# Patient Record
Sex: MALE | Race: WHITE | NOT HISPANIC OR LATINO | Employment: FULL TIME | URBAN - METROPOLITAN AREA
[De-identification: names, ages, dates, MRNs, and addresses within clinical notes are randomized per-mention and may not be internally consistent; named-entity substitution may affect disease eponyms.]

---

## 2019-09-17 ENCOUNTER — OFFICE VISIT - HEALTHEAST (OUTPATIENT)
Dept: PODIATRY | Facility: CLINIC | Age: 24
End: 2019-09-17

## 2019-09-17 DIAGNOSIS — R22.42 MASS OF LEFT FOOT: ICD-10-CM

## 2019-09-17 ASSESSMENT — MIFFLIN-ST. JEOR: SCORE: 1925.19

## 2019-09-30 ENCOUNTER — HOSPITAL ENCOUNTER (OUTPATIENT)
Dept: MRI IMAGING | Facility: HOSPITAL | Age: 24
Discharge: HOME | End: 2019-09-30
Attending: PODIATRIST

## 2019-09-30 DIAGNOSIS — R22.42 MASS OF LEFT FOOT: ICD-10-CM

## 2019-10-01 ENCOUNTER — OFFICE VISIT - HEALTHEAST (OUTPATIENT)
Dept: PODIATRY | Facility: CLINIC | Age: 24
End: 2019-10-01

## 2019-10-01 ENCOUNTER — SURGERY - HEALTHEAST (OUTPATIENT)
Dept: PODIATRY | Facility: CLINIC | Age: 24
End: 2019-10-01

## 2019-10-01 DIAGNOSIS — D21.22 FIBROMA OF LEFT FOOT: ICD-10-CM

## 2019-10-01 ASSESSMENT — MIFFLIN-ST. JEOR: SCORE: 1925.19

## 2019-10-02 ENCOUNTER — COMMUNICATION - HEALTHEAST (OUTPATIENT)
Dept: PODIATRY | Facility: CLINIC | Age: 24
End: 2019-10-02

## 2019-10-08 ENCOUNTER — COMMUNICATION - HEALTHEAST (OUTPATIENT)
Dept: PODIATRY | Facility: CLINIC | Age: 24
End: 2019-10-08

## 2019-10-17 ENCOUNTER — AMBULATORY - HEALTHEAST (OUTPATIENT)
Dept: PODIATRY | Facility: CLINIC | Age: 24
End: 2019-10-17

## 2019-10-24 ENCOUNTER — COMMUNICATION - HEALTHEAST (OUTPATIENT)
Dept: PEDIATRICS | Facility: CLINIC | Age: 24
End: 2019-10-24

## 2019-10-29 ENCOUNTER — OFFICE VISIT - HEALTHEAST (OUTPATIENT)
Dept: FAMILY MEDICINE | Facility: CLINIC | Age: 24
End: 2019-10-29

## 2019-10-29 DIAGNOSIS — Z01.818 PREOPERATIVE EXAMINATION: ICD-10-CM

## 2019-10-29 DIAGNOSIS — D21.22 FIBROMA OF LEFT FOOT: ICD-10-CM

## 2019-10-29 ASSESSMENT — MIFFLIN-ST. JEOR
SCORE: 1954.11
SCORE: 1925.19

## 2019-10-31 ENCOUNTER — ANESTHESIA - HEALTHEAST (OUTPATIENT)
Dept: SURGERY | Facility: AMBULATORY SURGERY CENTER | Age: 24
End: 2019-10-31

## 2019-11-01 ENCOUNTER — SURGERY - HEALTHEAST (OUTPATIENT)
Dept: SURGERY | Facility: AMBULATORY SURGERY CENTER | Age: 24
End: 2019-11-01

## 2019-11-01 ASSESSMENT — MIFFLIN-ST. JEOR: SCORE: 1925.19

## 2019-11-04 ENCOUNTER — COMMUNICATION - HEALTHEAST (OUTPATIENT)
Dept: VASCULAR SURGERY | Facility: CLINIC | Age: 24
End: 2019-11-04

## 2019-11-12 ENCOUNTER — OFFICE VISIT - HEALTHEAST (OUTPATIENT)
Dept: PODIATRY | Facility: CLINIC | Age: 24
End: 2019-11-12

## 2019-11-12 DIAGNOSIS — M60.272 FOREIGN BODY GRANULOMA OF SOFT TISSUE OF LEFT FOOT: ICD-10-CM

## 2019-11-12 ASSESSMENT — MIFFLIN-ST. JEOR: SCORE: 1925.19

## 2019-11-19 ENCOUNTER — OFFICE VISIT - HEALTHEAST (OUTPATIENT)
Dept: PODIATRY | Facility: CLINIC | Age: 24
End: 2019-11-19

## 2019-11-19 DIAGNOSIS — M60.272 FOREIGN BODY GRANULOMA OF SOFT TISSUE OF LEFT FOOT: ICD-10-CM

## 2019-11-19 ASSESSMENT — MIFFLIN-ST. JEOR: SCORE: 1925.19

## 2021-06-01 ENCOUNTER — RECORDS - HEALTHEAST (OUTPATIENT)
Dept: FAMILY MEDICINE | Facility: CLINIC | Age: 26
End: 2021-06-01

## 2021-06-01 NOTE — PROGRESS NOTES
FOOT AND ANKLE SURGERY/PODIATRY CONSULT NOTE         ASSESSMENT:   Soft tissue mass left foot      TREATMENT:  The patient was referred to radiology for an MRI of the left foot.  The patient is to return to the clinic in 1 week.         HPI: Aron Soler is a pleasant 23-year-old male who presented to the clinic today complaining of a painful lump on the bottom of his left foot.  The patient indicated this lump is near the second toe of the left foot.  He has had this for approximately 2 years.  He stated several years ago he had a cortisone injection at the level of this lesion.  He stated he had temporary relief of his pain.  The pain in the lump has recurred.  It is quite aggravated with weightbearing and ambulation.  The pain is relieved with nonweightbearing.  He has not noticed any redness, swelling, drainage of bleeding.  The pain is nonradiating.  He does not recall any particular trauma to the left foot.       History reviewed. No pertinent past medical history.    Past Surgical History:   Procedure Laterality Date     [place] REMOVE TONSILS/ADENOIDS,<12 Y/O      Description: Tonsillectomy With Adenoidectomy;  Recorded: 08/08/2008;  Comments: 1998       No Known Allergies    No current outpatient medications on file.    History reviewed. No pertinent family history.    Social History     Socioeconomic History     Marital status: Single     Spouse name: Not on file     Number of children: Not on file     Years of education: Not on file     Highest education level: Not on file   Occupational History     Not on file   Social Needs     Financial resource strain: Not on file     Food insecurity:     Worry: Not on file     Inability: Not on file     Transportation needs:     Medical: Not on file     Non-medical: Not on file   Tobacco Use     Smoking status: Never Smoker     Smokeless tobacco: Never Used   Substance and Sexual Activity     Alcohol use: Yes     Frequency: 2-4 times a month     Drinks per session: 1  or 2     Drug use: Never     Sexual activity: Not on file   Lifestyle     Physical activity:     Days per week: Not on file     Minutes per session: Not on file     Stress: Not on file   Relationships     Social connections:     Talks on phone: Not on file     Gets together: Not on file     Attends Church service: Not on file     Active member of club or organization: Not on file     Attends meetings of clubs or organizations: Not on file     Relationship status: Not on file     Intimate partner violence:     Fear of current or ex partner: Not on file     Emotionally abused: Not on file     Physically abused: Not on file     Forced sexual activity: Not on file   Other Topics Concern     Not on file   Social History Narrative     Not on file       Review of Systems - Patient denies fever, chills, rash, wound, stiffness, limping, numbness, weakness, heart burn, blood in stool, chest pain with activity, calf pain when walking, shortness of breath with activity, chronic cough, easy bleeding/bruising, swelling of ankles, excessive thirst, fatigue, depression, anxiety.  Patient admits to painful mass left foot.      OBJECTIVE:  Appearance: alert, well appearing, and in no distress.    Vitals:    09/17/19 1513   BP: 124/88   Pulse: 76   Resp: 18   Temp: 98.1  F (36.7  C)       BMI= Body mass index is 27.12 kg/m .    General appearance: Patient is alert and fully cooperative with history & exam.  No sign of distress is noted during the visit.  Psychiatric: Affect is pleasant & appropriate.  Patient appears motivated to improve health.  Respiratory: Breathing is regular & unlabored while sitting.  HEENT: Hearing is intact to spoken word.  Speech is clear.  No gross evidence of visual impairment that would impact ambulation.    Vascular: Dorsalis pedis and posterior tibial pulses are palpable. There is pedal hair growth bilaterally.  CFT < 3 sec from anterior tibial surface to distal digits bilaterally. There is no  appreciable edema noted.  Dermatologic: There is a small raised firm palpable mass on the plantar aspect of the base of the second toe left foot.  Turgor and texture are within normal limits. No coloration or temperature changes. No other primary or secondary lesions noted.  Neurologic: All epicritic and proprioceptive sensations are grossly intact bilaterally.  Musculoskeletal: All active and passive ankle, subtalar, midtarsal, and 1st MPJ range of motion are grossly intact without pain or crepitus, with the exception of none. Manual muscle strength is within normal limits bilaterally. All dorsiflexors, plantarflexors, invertors, evertors are intact bilaterally.  No tenderness present to small lesion on the plantar aspect of the left foot on palpation.  No tenderness to second digit left foot with range of motion. Calf is soft/non-tender without warmth/induration    Imaging:     No results found.       TREATMENT:  The patient has been referred to radiology for an MRI of the left foot in an attempt to identify the lesion on the plantar aspect of the left foot.  I informed the patient that he may need surgical excision of this small mass.  I have asked the patient to return to clinic in 1 week to discuss the findings of the MRI.    Robe Almazan; CLIVE  [facility]/Podiatry

## 2021-06-01 NOTE — PROGRESS NOTES
Subjective findings: The patient return to the clinic today to discuss the findings of his MRI of the left foot.  The patient was referred to radiology for an MRI because he has a painful lump on the bottom of his left foot between the first and second toes.  I informed the patient that the MRI indicated that there is a nonspecific lesion located between the first and second toes.  This could represent a fibroma on old granuloma.  I am recommending excision and biopsy of this lesion.  The patient was told the procedure will be done on an outpatient basis under local anesthesia with IV sedation.  He was told the procedure should take approximately 15 minutes to perform.  He would then be discharged and able to weight-bear in a postoperative shoe for 2 weeks.  The patient was asked to go n.p.o. at midnight prior to the procedure and he was asked to see his primary care physician for preoperative consultation.  The patient was given a written list of potential postoperative complications.  I informed the patient he could have some potential numbness in the area of the lesion as result of this procedure.  All pertinent questions were answered.

## 2021-06-01 NOTE — PATIENT INSTRUCTIONS - HE
Surgery Information    Surgery Date Reema will call you     ( Arrive at the hospital one and a half hours prior to your surgery and 1 hour prior at the surgery center. Check in at the . The only exception is if you are scheduled for surgery at 7am, you should arrive at 5:30am) The nurse will call you a couple of day before surgery go with the time the nurse tells you.  All surgery times are estimated please go with what the nurse tells you when she calls.  Emergency's do happen and surgery times do get changed the nurse will let you know.  We give you the best estimate of time that we can at the time.      IF YOU NEED TO RESCHEDULE OR CANCEL YOUR SURGERY FOR ANY REASON PLEASE CALL THE CLINIC AS SOON AS POSSIBLE -[phone].    Admission Type: Outpatient    Surgeon: Dr. Almazan    Surgery Procedure:Excision Fibroma(50123)    Surgery Location: [facility],[address] [place] [address], FAX [phone]    Additional Information: If you use a CPAP machine for sleep apnea please bring it with you for surgery. We will want to monitor your breathing using your normal equipment.     HOSPITAL AND SURGERY CENTER INFORMATION    We need to know a lot of information about you before surgery.     1-5 Days Before:     A nurse will call you for a pre-screening interview. The phone call with the nurse will be much faster and easier if you  Have organized your information prior to the call.   This is when you will be told when to show up and what to bring.    Please have the following information available:    Preoperative Exam completed and faxed to our office  has to be within 30 days will not except 31 days.    Primary care provider's and any specialty providers' contact information available. .    If requested by your primary care provider, have any heart and lung exams at least 3 days before surgery.    Have a complete and accurate list of medications available.     Have a list of your  allergies/sensitivities and reactions    Know your health history, surgical history, and medical problems    Know any anesthesia issues with you or within your family.     BE SURE TO NOTIFY US IF YOU ARE TAKING ANY BLOOD THINNING AGENTS: Coumadin, Plavix, Aspirin, Xarelto, Eliquis    Someone plan to bring you and stay with you after the surgery for 24 hours    Day Before Surgery    1. STOP Smoking and Drinking: It is important to stop smoking and drinking at least 24 hours before surgery.  Smoking and drinking may cause complications in your recovery from anesthesia and may lengthen the healing process.    Please bring with you the day of surgery      List of medication    Eyeglass case or contact case with solution. You cannot wear contacts during surgery    Copy of Health Care Directives, Living Will or Power of     Insurance Cards    Photo ID    3. NOTHING BY MOUTH 8 HOURS BEFORE. This includes gum, hard candy, water and mints. The only exception is if you have been instructed by your doctor to take your medications with sips of water. You may rinse your mouth or brush your teeth, but do not swallow water.     4. Remove Nail Polish on fingers as well as toes  Day of Surgery    1. Medications- Take as indicated with sips of water     2. Wear comfortable loose fitting clothes. Wear your glasses-Not contacts. Do not wear jewelry and remove body piercing's. Surgery may be cancelled if they are not removed.     3. If same day surgery-Have a someone come with you to surgery that can help you understand the surgeon's instructions, drive you home and stay with you overnight the first night.     4. A nurse will call you at home within 72 hours following surgery to see how you are doing. Our nurses and doctors will discuss recovery with you.   POST-OPERATIVE CARE INSTRUCTIONS    After surgery  You will have swelling in the foot, and pain from the incision. The swelling is related to the increased blood flow to the  "foot in response to the \"surgical injury\" as well as the decreased capacity of blood to return to the heart due to less lower leg muscle contractions (which have the effect of increasing venous blood return). Swelling is often directly proportional to the pain. The greatest swelling occurs in the first 3-4 days after surgery . After that the swelling gradually diminishes. However, it often takes many months (or even a year for major surgery) for all of the swelling to resolve. The pain associated with swelling can vary widely. If it is not managed appropriately it can be very uncomfortable and frustrating.      1. Following surgery, go directly home and elevate your foot. Elevate your foot about 6 inches about the level of you hip by placing pillows under your foot and leg. Do not sit with your foot down, dangling or with your feet crossed.      2. Rest as often as possible with your foot elevated to reduce the occurrence of swelling and associated pain. Reserve walking for using the restroom and getting food/beverages.     3. Place ice over foot/ ankle area or behind the knee (if casted) for 20 minutes of each hour for 24 hours. Ice should NEVER be used when the foot is numb from a nerve block as this can easily lead to frostbite.     4. Use pain medications as prescribed with food. Once home, take pain medication and do the same at supper time and bedtime. The pain medication and ice should help to \"control\" your pain, however, it may NOT take away all of the pain.      Take or ask for pain relief medication when pain begins. It is easier to prevent or relieve pain before it becomes too bad.     Some activities may make pain worse. Take your pain medicine first.     Your doctor and nurse use a 0-10 pain rating scale to report your pain. 0= no pain and 10 = the worst possible pain. Reporting a number helps us to understand how well your pain control plan is working and if your pain control plan needs changes. "      5. Keep your bandages clean and dry while the incision heals. A small amount of bleeding is normal. DO NOT CHANGE THE DRESSING! If your bandages become damp call our office immediately. Options to keep this area dry when bathing include:      SPONGE BATHS    PLASTIC BAG WITH TAPE OR CAST BAG     SHOWER CHAIR OR HANDHELD SHOWER HEAD    DANGLING YOUR LEG/FOOT OVER THE EDGE OF THE TUB     6. Exercise your legs frequently by bending your knees to stimulate circulation and help aid in healing.     7. If given a post operative shoe or cast boot wear at all times when walking. You may remove the post-operative shoe or cast boot at bedtime. If the cast boot becomes too heavy or painful it may be removed only while the foot is elevated and not bearing weight.      8. DO NOT operate heavy equipment, drive a vehicle for 24 hours after surgery and while taking pain medications. You may resume driving when you feel you can do so safely.     CALL IMMEDIATELY IF:    THE BANDAGES ARE SOAKED FROM BLOOD, DRAINAGE OR WATER    YOUR MEDICATION DOES NOT MANAGE THE DISCOMFORT    YOU INJURE YOUR FOOT    YOU DEVELOP A FEVER    THE BANDAGES BECOME TOO TIGHT OR YOUR TOES BECOME NUMB (after 24 hours you may cut your bandage 1 inch from the toe area and ankle area this will relieve pain)    PLEASE CALL THE [facility] PODIATRY LINE -[phone] WITH ANY QUESTION OR CONCERNS. IF CALLING AFTER REGULAR BUSINESS HOURS THE PHYSICIAN ON CALL WILL BE PAGED TO RETURN YOUR CALL.    The doctor will need to see you for 1-3 post operative appointments depending on your surgery and recovery. Please ensure these are scheduled before your surgery or immediately after surgery.

## 2021-06-02 NOTE — PROGRESS NOTES
Preoperative Exam    Scheduled Procedure:EXCISION, FIBROMA, PLANTAR left foot   Surgery Date:  11/1/2019  Surgery Location: [facility], fax [phone]    Surgeon:  Dr. Almazan     Assessment/Plan:     1. Fibroma of left foot  2. Preoperative examination    Cleared for surgery.    Surgical Procedure Risk: Low (reported cardiac risk generally < 1%)  Have you had prior anesthesia?: Yes  Have you or any family members had a previous anesthesia reaction:  No  Do you or any family members have a history of a clotting or bleeding disorder?: No  Cardiac Risk Assessment: no increased risk for major cardiac complications    APPROVAL GIVEN to proceed with proposed procedure, without further diagnostic evaluation    Functional Status: Independent  Patient plans to recover at home with family.     Subjective:      Aron Soler is a 24 y.o. male who presents for a preoperative consultation.  Pain in left foot plantar aspect with running or tennis. MRI showed mass, possible fibroma or granuloma. Having removal by podiatry.    All other systems reviewed and are negative, other than those listed in the HPI.    Pertinent History  Do you have difficulty breathing or chest pain after walking up a flight of stairs: No  History of obstructive sleep apnea: No  Steroid use in the last 6 months: No  Frequent Aspirin/NSAID use: No  Prior Blood Transfusion: No  Prior Blood Transfusion Reaction: No  If for some reason prior to, during or after the procedure, if it is medically indicated, would you be willing to have a blood transfusion?:  There is no transfusion refusal.    No current outpatient medications on file.     No current facility-administered medications for this visit.         No Known Allergies    Patient Active Problem List   Diagnosis     Pes Planus     Acne     Fibroma of left foot       No past medical history on file.    Past Surgical History:   Procedure Laterality Date     [place] REMOVE TONSILS/ADENOIDS,<11  Y/O      Description: Tonsillectomy With Adenoidectomy;  Recorded: 08/08/2008;  Comments: 1998       Social History     Socioeconomic History     Marital status: Single     Spouse name: Not on file     Number of children: Not on file     Years of education: Not on file     Highest education level: Not on file   Occupational History     Not on file   Social Needs     Financial resource strain: Not on file     Food insecurity:     Worry: Not on file     Inability: Not on file     Transportation needs:     Medical: Not on file     Non-medical: Not on file   Tobacco Use     Smoking status: Never Smoker     Smokeless tobacco: Never Used   Substance and Sexual Activity     Alcohol use: Yes     Frequency: 2-4 times a month     Drinks per session: 1 or 2     Drug use: Never     Sexual activity: Not on file   Lifestyle     Physical activity:     Days per week: Not on file     Minutes per session: Not on file     Stress: Not on file   Relationships     Social connections:     Talks on phone: Not on file     Gets together: Not on file     Attends [employer] service: Not on file     Active member of club or organization: Not on file     Attends meetings of clubs or organizations: Not on file     Relationship status: Not on file     Intimate partner violence:     Fear of current or ex partner: Not on file     Emotionally abused: Not on file     Physically abused: Not on file     Forced sexual activity: Not on file   Other Topics Concern     Not on file   Social History Narrative     Not on file             Objective:     Vitals:    10/29/19 1527 10/29/19 1600   BP: 142/85 124/75   Pulse: 90    Resp: 16    Temp: 98.1  F (36.7  C)    TempSrc: Oral    Weight: 206 lb 6 oz (93.6 kg)    Height: 6' (1.829 m)          Physical Exam:  GENERAL:  Pleasant, well-appearing man in no acute distress.  VITAL SIGNS:  Reviewed.  HEENT:  Pupils are equal, round, and reactive to light.  Sclerae and  conjunctivae clear.  Oropharynx is clear  with  moist mucous membranes.  NECK:  Supple without lymphadenopathy or thyromegaly.    CARDIOVASCULAR:  Heart regular rate and rhythm without murmur.  Normal S1 and  S2.  LUNGS:  Clear to auscultation bilaterally without wheezes or crackles.  Good  air movement throughout.    ABDOMEN:  Soft, nontender, and nondistended without  guarding or rebound.  No organomegaly.  EXTREMITIES:  Warm and well perfused without edema. Dorsalis pedis pulses easily palpable and symmetric bilaterally.  NEURO: Alert and oriented. Grossly nonfocal.  PSYCHIATRIC: Presents on time and well groomed.  Normal speech and thought content.  Full affect.  No abnormal movements or behaviors noted.     There are no Patient Instructions on file for this visit.      Labs:  No labs were ordered during this visit    Immunization History   Administered Date(s) Administered     DTaP, historic 1994, 01/24/1995, 03/27/1995, 03/07/2000     HPV Quadrivalent 07/17/2013     Hep A, historic 08/13/2007, 08/21/2008     Hep B, historic 1994, 01/24/1995, 07/18/1995     HiB, historic,unspecified 1994, 01/24/1995, 03/27/1995, 12/26/1995     IPV 1994, 01/24/1995, 03/27/1995, 03/07/2000     Influenza, inj, historic,unspecified 11/01/2008     MMR 10/05/1995, 03/07/2000     Meningococcal MCV4P 08/13/2007, 07/17/2013     Tdap 08/13/2007     Varicella 12/26/1995, 08/21/2008           Electronically signed by Oneyda Wang MD 10/29/19 3:29 PM

## 2021-06-02 NOTE — ANESTHESIA CARE TRANSFER NOTE
Last vitals:   Vitals:    11/01/19 0637   BP: 139/81   Pulse: 81   Resp: 16   Temp: 36.3  C (97.3  F)   SpO2: 99%     Patient's level of consciousness is drowsy  Spontaneous respirations: yes  Maintains airway independently: yes  Dentition unchanged: yes  Oropharynx: oropharynx clear of all foreign objects    QCDR Measures:  ASA# 20 - Surgical Safety Checklist: WHO surgical safety checklist completed prior to induction    PQRS# 430 - Adult PONV Prevention: 4558F - Pt received => 2 anti-emetic agents (different classes) preop & intraop  ASA# 8 - Peds PONV Prevention: NA - Not pediatric patient, not GA or 2 or more risk factors NOT present  PQRS# 424 - Tasha-op Temp Management: NA - MAC anesthesia or case < 60 minutes  PQRS# 426 - PACU Transfer Protocol: - Transfer of care checklist used  ASA# 14 - Acute Post-op Pain: ASA14B - Patient did NOT experience pain >= 7 out of 10

## 2021-06-02 NOTE — PROGRESS NOTES
Surgery/Procedure: Excision, Fibroma, Plantar left foot    Special Equipment: TBD    [place] [facility] [place]: [place]01/19    Time: 7:00am    Surgeon: Dr. Almazan    Assist: No    Length of Surgery: 60 minutes    OR Confirmed/ :  Yes keli Ashley on 10/17/19    Orders In:  Yes    Provider Team Notified:  Yes    Entered on [employer] Calendar:  Yes    Post Op: 11/12/19 and 11/19/19 @ [facility]

## 2021-06-02 NOTE — TELEPHONE ENCOUNTER
New Appointment Needed  What is the reason for the visit:    Pre-Op Appt Request  When is the surgery? :  11.01.19  Where is the surgery?: [facility]  Who is the surgeon? :  Dr is unknown  What type of surgery is being done?: remover cyst from foot   Provider Preference: Any available  How soon do you need to be seen?: before 11.01.19  Waitlist offered?: No  Okay to leave a detailed message:  Yes

## 2021-06-02 NOTE — ANESTHESIA POSTPROCEDURE EVALUATION
Patient: Aron TAYLOR Ryder  EXCISION, FIBROMA, PLANTAR left foot  Anesthesia type: MAC    Patient location: Phase II Recovery  Last vitals:   Vitals Value Taken Time   /80 11/1/2019  8:45 AM   Pulse 63 11/1/2019  8:45 AM   Resp 16 11/1/2019  8:45 AM   SpO2 100 % 11/1/2019  8:45 AM     Post vital signs: stable  Level of consciousness: awake and responds to simple questions  Post-anesthesia pain: pain controlled  Post-anesthesia nausea and vomiting: no  Pulmonary: unassisted, return to baseline  Cardiovascular: stable and blood pressure at baseline  Hydration: adequate  Anesthetic events: no    QCDR Measures:  ASA# 11 - Tasha-op Cardiac Arrest: ASA11B - Patient did NOT experience unanticipated cardiac arrest  ASA# 12 - Tasha-op Mortality Rate: ASA12B - Patient did NOT die  ASA# 13 - PACU Re-Intubation Rate: NA - No ETT / LMA used for case  ASA# 10 - Composite Anes Safety: ASA10A - No serious adverse event    Additional Notes:

## 2021-06-03 VITALS — HEIGHT: 72 IN | WEIGHT: 200 LBS | BODY MASS INDEX: 27.09 KG/M2

## 2021-06-03 VITALS
TEMPERATURE: 98.1 F | BODY MASS INDEX: 27.09 KG/M2 | WEIGHT: 200 LBS | SYSTOLIC BLOOD PRESSURE: 124 MMHG | DIASTOLIC BLOOD PRESSURE: 88 MMHG | HEART RATE: 76 BPM | HEIGHT: 72 IN | RESPIRATION RATE: 18 BRPM

## 2021-06-03 VITALS
RESPIRATION RATE: 16 BRPM | TEMPERATURE: 98.1 F | HEART RATE: 90 BPM | DIASTOLIC BLOOD PRESSURE: 75 MMHG | HEIGHT: 72 IN | WEIGHT: 206.38 LBS | BODY MASS INDEX: 27.95 KG/M2 | SYSTOLIC BLOOD PRESSURE: 124 MMHG

## 2021-06-03 VITALS
DIASTOLIC BLOOD PRESSURE: 80 MMHG | HEIGHT: 72 IN | WEIGHT: 200 LBS | BODY MASS INDEX: 27.09 KG/M2 | SYSTOLIC BLOOD PRESSURE: 122 MMHG

## 2021-06-03 NOTE — PROGRESS NOTES
Subjective findings: The patient return to the clinic today for postop visit #1, 1 week status post excision foreign body granuloma left foot.  The patient is in good spirits and she had no complaints.     Objective findings: The dressings were removed and the wound margins are well coaptated and maintained.  There is no edema, erythema, cellulitis, drainage or bleeding noted.  Neurovascular status is intact.  Vital signs are stable.  Surgical correction has been maintained.     Assessment: Foreign body granuloma left foot     Plan: Applied a Band-Aid across the incision site.  The patient was instructed to keep the wound clean and dry for an additional week.  He is to return to the clinic in 1 week for postop visit #2 at which time sutures will be removed.

## 2021-06-03 NOTE — PROGRESS NOTES
Subjective findings: The patient return to the clinic today for postop visit #2, 2 weeks status post excision foreign body granuloma left foot.  The patient is in good spirits and she had no complaints.     Objective findings: The dressings were removed and the wound margins are well coaptated and maintained.  There is no edema, erythema, cellulitis, drainage or bleeding noted.  Neurovascular status is intact.  Vital signs are stable.  Surgical correction has been maintained.     Assessment: Foreign body granuloma left foot     Plan: All sutures were removed today.  I have instructed the patient to gradually return to normal activities.  He was told he may start to increase activities within the next 2 to 3 weeks.  He is to return to the clinic as needed.

## 2021-06-03 NOTE — TELEPHONE ENCOUNTER
Patients mother is calling requesting a handicap pass due to the surgery.  Please advise what the steps are to get this and call [facility] back at [phone]

## 2021-06-04 VITALS — WEIGHT: 200 LBS | HEIGHT: 72 IN | HEART RATE: 62 BPM | OXYGEN SATURATION: 98 % | BODY MASS INDEX: 27.09 KG/M2

## 2021-06-04 VITALS
HEIGHT: 72 IN | HEART RATE: 77 BPM | SYSTOLIC BLOOD PRESSURE: 120 MMHG | TEMPERATURE: 98.1 F | OXYGEN SATURATION: 99 % | WEIGHT: 200 LBS | BODY MASS INDEX: 27.09 KG/M2 | DIASTOLIC BLOOD PRESSURE: 82 MMHG

## 2021-06-16 PROBLEM — D21.22 FIBROMA OF LEFT FOOT: Status: ACTIVE | Noted: 2019-10-17

## 2021-06-25 NOTE — TELEPHONE ENCOUNTER
New Appointment Needed  What is the reason for the visit:    Same Date/Next Day Appt Request  What is the reason for your visit?: Physical, Patients Father Mj Soler sees Dr Mallory and the patient would like to know if he would take him on as a new patient.  Please advise patient    Provider Preference: PCP only  How soon do you need to be seen?: as soon as available  Waitlist offered?: No  Okay to leave a detailed message:  Yes

## 2021-07-03 NOTE — ANESTHESIA PREPROCEDURE EVALUATION
Anesthesia Preprocedure Evaluation by Sushant Baldwin MD at 11/1/2019  6:42 AM     Author: Sushant Baldwin MD Service: -- Author Type: Physician    Filed: 11/1/2019  6:43 AM Date of Service: 11/1/2019  6:42 AM Status: Signed    : Sushant Baldwin MD (Physician)       Anesthesia Evaluation      Patient summary reviewed   No history of anesthetic complications     Airway   Mallampati: I  Neck ROM: full   Pulmonary - negative ROS and normal exam                          Cardiovascular - negative ROS and normal exam   Neuro/Psych - negative ROS     Endo/Other - negative ROS      GI/Hepatic/Renal - negative ROS           Dental    (+) chipped                           Anesthesia Plan  Planned anesthetic: MAC    ASA 1     Anesthetic plan and risks discussed with: patient  Anesthesia plan special considerations: antiemetics,   Post-op plan: routine recovery

## 2021-07-21 ENCOUNTER — OFFICE VISIT (OUTPATIENT)
Dept: FAMILY MEDICINE | Facility: CLINIC | Age: 26
End: 2021-07-21
Payer: COMMERCIAL

## 2021-07-21 VITALS
SYSTOLIC BLOOD PRESSURE: 118 MMHG | WEIGHT: 201 LBS | HEIGHT: 72 IN | HEART RATE: 69 BPM | DIASTOLIC BLOOD PRESSURE: 83 MMHG | RESPIRATION RATE: 16 BRPM | BODY MASS INDEX: 27.22 KG/M2

## 2021-07-21 DIAGNOSIS — Z00.00 HEALTH CARE MAINTENANCE: Primary | ICD-10-CM

## 2021-07-21 PROCEDURE — 99385 PREV VISIT NEW AGE 18-39: CPT | Performed by: FAMILY MEDICINE

## 2021-07-21 ASSESSMENT — MIFFLIN-ST. JEOR: SCORE: 1929.73

## 2021-07-26 NOTE — PROGRESS NOTES
HPI:    S: Aron is a 25 year old male presenting for his annual exam.     He has no acute concerns today.  Patient works as a .  He is single.  Not in a relationship at this time is not interested in STD testing.  Is interested in lab work when fasting.  Up-to-date immunizations.  Exercises on a regular basis.  Patient has some blocked glands underneath his eye which we discussed monitoring.  Discussed most shoulder strengthening exercises to work on strength and mobility.    Health maintenance issues for the patients age and sex were reviewed.      History reviewed. No pertinent past medical history.    Past Surgical History:   Procedure Laterality Date     HC PART EXCIS PLANTAR FASCIA Left 11/1/2019    Procedure: EXCISION, FIBROMA, PLANTAR left foot;  Surgeon: Robe Almazan DPM;  Location: [facility];  Service: Podiatry     HC REMOVE TONSILS/ADENOIDS,<13 Y/O      Description: Tonsillectomy With Adenoidectomy;  Recorded: 08/08/2008;  Comments: 1998     TONSILLECTOMY         Social History     Tobacco Use     Smoking status: Never Smoker     Smokeless tobacco: Never Used   Substance Use Topics     Alcohol use: Yes         No current outpatient medications on file.     OTC products: None, except as noted above    No Known Allergies       REVIEW OF SYSTEMS:   Constitutional, HEENT, cardiovascular, pulmonary, gi and gu systems are negative, except as otherwise noted.    EXAM:   /83 (BP Location: Left arm, Patient Position: Sitting, Cuff Size: Adult Large)   Pulse 69   Resp 16   Ht 1.829 m (6')   Wt 91.2 kg (201 lb)   BMI 27.26 kg/m    GENERAL APPEARANCE: healthy, alert and no distress  HENT: ear canals and TM's normal and nose and mouth without ulcers or lesions  RESP: lungs clear to auscultation - no rales, rhonchi or wheezes  CV: regular rate and rhythm, normal S1 S2, no S3 or S4 and no murmur, click or rub   ABDOMEN: soft, nontender, no HSM or masses and bowel sounds  normal  NEURO: Normal strength and tone, sensory exam grossly normal, mentation intact and speech normal  PSYCH: mentation appears normal and affect normal/bright    Immunization History   Administered Date(s) Administered     DTaP, Unspecified 1994, 01/24/1995, 03/27/1995, 03/07/2000     Flu, Unspecified 11/01/2008     HPV Quadrivalent 07/17/2013, 07/21/2015     HepA, Unspecified 08/13/2007, 08/21/2008     HepB, Unspecified 1994, 01/24/1995, 07/18/1995     Hib, Unspecified 1994, 01/24/1995, 03/27/1995, 12/26/1995     MMR 10/05/1995, 03/07/2000     Meningococcal (Menactra ) 08/13/2007, 07/17/2013     Poliovirus, inactivated (IPV) 1994, 01/24/1995, 03/27/1995, 03/07/2000     Tdap (Adacel,Boostrix) 08/13/2007, 08/10/2017     Varicella 12/26/1995, 08/21/2008         No results found for: CHOL  No results found for: TRIG  No results found for: HDL  No results found for: LDL  Lab Results   Component Value Date    AST 12 12/16/2015     Lab Results   Component Value Date    ALT 14 12/16/2015       Lab Results   Component Value Date     12/16/2015     Lab Results   Component Value Date    HGB 13.9 12/16/2015     No results found for: TSH  Lab Results   Component Value Date    CR 1.19 12/16/2015           IMPRESSION:     S: Aron is a 25 year old male presenting for an annual exam, patient has no acute concerns today.  Spent time reviewing his past medical history as patient is establishing care with me today.  He will return for fasting labs in the near future.  Nehemias will continue with exercise.    RECOMMENDATIONS:     Eat healthy  Exercise regularly  Maintain yearly follow-up  Return for fasting blood work

## 2025-01-30 ENCOUNTER — OFFICE VISIT (OUTPATIENT)
Dept: FAMILY MEDICINE | Facility: CLINIC | Age: 30
End: 2025-01-30
Payer: COMMERCIAL

## 2025-01-30 VITALS
OXYGEN SATURATION: 98 % | HEIGHT: 73 IN | TEMPERATURE: 98 F | SYSTOLIC BLOOD PRESSURE: 109 MMHG | DIASTOLIC BLOOD PRESSURE: 77 MMHG | BODY MASS INDEX: 24.39 KG/M2 | HEART RATE: 76 BPM | WEIGHT: 184 LBS | RESPIRATION RATE: 16 BRPM

## 2025-01-30 DIAGNOSIS — Z00.00 HEALTH CARE MAINTENANCE: Primary | ICD-10-CM

## 2025-01-30 DIAGNOSIS — B35.4 TINEA CORPORIS: ICD-10-CM

## 2025-01-30 DIAGNOSIS — K21.9 GASTROESOPHAGEAL REFLUX DISEASE WITHOUT ESOPHAGITIS: ICD-10-CM

## 2025-01-30 LAB
ALBUMIN SERPL BCG-MCNC: 4.8 G/DL (ref 3.5–5.2)
ALP SERPL-CCNC: 53 U/L (ref 40–150)
ALT SERPL W P-5'-P-CCNC: 10 U/L (ref 0–70)
ANION GAP SERPL CALCULATED.3IONS-SCNC: 10 MMOL/L (ref 7–15)
AST SERPL W P-5'-P-CCNC: 17 U/L (ref 0–45)
BILIRUB SERPL-MCNC: 1.8 MG/DL
BUN SERPL-MCNC: 23.8 MG/DL (ref 6–20)
CALCIUM SERPL-MCNC: 10.3 MG/DL (ref 8.8–10.4)
CHLORIDE SERPL-SCNC: 102 MMOL/L (ref 98–107)
CHOLEST SERPL-MCNC: 135 MG/DL
CREAT SERPL-MCNC: 1.07 MG/DL (ref 0.67–1.17)
EGFRCR SERPLBLD CKD-EPI 2021: >90 ML/MIN/1.73M2
ERYTHROCYTE [DISTWIDTH] IN BLOOD BY AUTOMATED COUNT: 11.8 % (ref 10–15)
FASTING STATUS PATIENT QL REPORTED: YES
FASTING STATUS PATIENT QL REPORTED: YES
GLUCOSE SERPL-MCNC: 94 MG/DL (ref 70–99)
HCO3 SERPL-SCNC: 28 MMOL/L (ref 22–29)
HCT VFR BLD AUTO: 42.9 % (ref 40–53)
HDLC SERPL-MCNC: 43 MG/DL
HGB BLD-MCNC: 14.4 G/DL (ref 13.3–17.7)
LDLC SERPL CALC-MCNC: 80 MG/DL
MCH RBC QN AUTO: 28.9 PG (ref 26.5–33)
MCHC RBC AUTO-ENTMCNC: 33.6 G/DL (ref 31.5–36.5)
MCV RBC AUTO: 86 FL (ref 78–100)
NONHDLC SERPL-MCNC: 92 MG/DL
PLATELET # BLD AUTO: 243 10E3/UL (ref 150–450)
POTASSIUM SERPL-SCNC: 3.8 MMOL/L (ref 3.4–5.3)
PROT SERPL-MCNC: 7.5 G/DL (ref 6.4–8.3)
RBC # BLD AUTO: 4.98 10E6/UL (ref 4.4–5.9)
SODIUM SERPL-SCNC: 140 MMOL/L (ref 135–145)
TRIGL SERPL-MCNC: 62 MG/DL
WBC # BLD AUTO: 5.1 10E3/UL (ref 4–11)

## 2025-01-30 RX ORDER — CLOTRIMAZOLE 1 %
CREAM (GRAM) TOPICAL 2 TIMES DAILY
Qty: 30 G | Refills: 0 | Status: SHIPPED | OUTPATIENT
Start: 2025-01-30

## 2025-01-30 SDOH — HEALTH STABILITY: PHYSICAL HEALTH: ON AVERAGE, HOW MANY DAYS PER WEEK DO YOU ENGAGE IN MODERATE TO STRENUOUS EXERCISE (LIKE A BRISK WALK)?: 2 DAYS

## 2025-01-30 SDOH — HEALTH STABILITY: PHYSICAL HEALTH: ON AVERAGE, HOW MANY MINUTES DO YOU ENGAGE IN EXERCISE AT THIS LEVEL?: 20 MIN

## 2025-01-30 ASSESSMENT — SOCIAL DETERMINANTS OF HEALTH (SDOH): HOW OFTEN DO YOU GET TOGETHER WITH FRIENDS OR RELATIVES?: THREE TIMES A WEEK

## 2025-01-30 NOTE — PROGRESS NOTES
Preventive Care Visit  [facility]  Dayday Mallory MD, Family Medicine  Jan 30, 2025      Assessment & Plan     Health care maintenance  Patient here for annual exam interested in fasting blood work  Up-to-date on immunizations  - CBC with platelets  - Comprehensive metabolic panel (BMP + Alb, Alk Phos, ALT, AST, Total. Bili, TP)  - Lipid Profile (Chol, Trig, HDL, LDL calc)  - CBC with platelets  - Comprehensive metabolic panel (BMP + Alb, Alk Phos, ALT, AST, Total. Bili, TP)  - Lipid Profile (Chol, Trig, HDL, LDL calc)    Tinea corporis  Patient is using topical clotrimazole discussed the need for using on a regular basis and keeping the area dry  - clotrimazole (LOTRIMIN) 1 % external cream  Dispense: 30 g; Refill: 0    Gastroesophageal reflux disease without esophagitis  Periodically having some acid reflux has not had known triggers  Discussed.  Pepcid for symptom control        Patient has been advised of split billing requirements and indicates understanding: Yes        Counseling  Appropriate preventive services were addressed with this patient via screening, questionnaire, or discussion as appropriate for fall prevention, nutrition, physical activity, Tobacco-use cessation, social engagement, weight loss and cognition.  Checklist reviewing preventive services available has been given to the patient.  Reviewed patient's diet, addressing concerns and/or questions.   He is at risk for lack of exercise and has been provided with information to increase physical activity for the benefit of his well-being.   He is at risk for psychosocial distress and has been provided with information to reduce risk.           Sherice Sharp is a 29 year old, presenting for the following:  Physical (Been experiencing heartburn recently at random with some stomach pain)        1/30/2025     7:24 AM   Additional Questions   Roomed by Maximus DE LEÓN MA          [facility]  Patient here for annual  exam  Patient declines any new patient has not been seen for greater than 3 years in clinic.  He has been having some issues with acid reflux but this has improved recently recently broke up with his significant other a few months ago could not a trigger for some stress.  Discussed periodic use of Pepcid for as needed symptom control.  He is using some topical clotrimazole for some tinea corporis on his chest has not been regular with its use discussed the importance of using it twice daily regularly and keeping the area dry as possible for clearance.  He works as a  for [employer].  Currently is not in a relationship as no concerns for STDs.            1/30/2025   General Health   How would you rate your overall physical health? Good   Feel stress (tense, anxious, or unable to sleep) To some extent   (!) STRESS CONCERN      1/30/2025   Nutrition   Three or more servings of calcium each day? (!) I DON'T KNOW   Diet: Regular (no restrictions)   How many servings of fruit and vegetables per day? (!) 2-3   How many sweetened beverages each day? (!) 2         1/30/2025   Exercise   Days per week of moderate/strenous exercise 2 days   Average minutes spent exercising at this level 20 min   (!) EXERCISE CONCERN      1/30/2025   Social Factors   Frequency of gathering with friends or relatives Three times a week   Worry food won't last until get money to buy more No   Food not last or not have enough money for food? No   Do you have housing? (Housing is defined as stable permanent housing and does not include staying ouside in a car, in a tent, in an abandoned building, in an overnight shelter, or couch-surfing.) Yes   Are you worried about losing your housing? No   Lack of transportation? No   Unable to get utilities (heat,electricity)? No         1/30/2025   Dental   Dentist two times every year? Yes         1/30/2025   TB Screening   Were you born outside of the [place]? No         Today's PHQ-2 Score:       1/30/2025  "    7:19 AM   PHQ-2 ( 1999 [employer])   Q1: Little interest or pleasure in doing things 0   Q2: Feeling down, depressed or hopeless 0   PHQ-2 Score 0    Q1: Little interest or pleasure in doing things Not at all   Q2: Feeling down, depressed or hopeless Not at all   PHQ-2 Score 0       Patient-reported           1/30/2025   Substance Use   Alcohol more than 3/day or more than 7/wk No   Do you use any other substances recreationally? No     Social History     Tobacco Use    Smoking status: Never    Smokeless tobacco: Never   Substance Use Topics    Alcohol use: Yes    Drug use: Never           1/30/2025   STI Screening   New sexual partner(s) since last STI/HIV test? No         1/30/2025   Contraception/Family Planning   Questions about contraception or family planning No        Reviewed and updated as needed this visit by Provider                             Objective    Exam  /77   Pulse 76   Temp 98  F (36.7  C) (Oral)   Resp 16   Ht 1.842 m (6' 0.5\")   Wt 83.5 kg (184 lb)   SpO2 98%   BMI 24.61 kg/m     Estimated body mass index is 24.61 kg/m  as calculated from the following:    Height as of this encounter: 1.842 m (6' 0.5\").    Weight as of this encounter: 83.5 kg (184 lb).    Physical Exam  GENERAL: alert and no distress  EYES: Eyes grossly normal to inspection, PERRL and conjunctivae and sclerae normal  RESP: lungs clear to auscultation - no rales, rhonchi or wheezes  CV: regular rate and rhythm, normal S1 S2, no S3 or S4, no murmur, click or rub, no peripheral edema  MS: no gross musculoskeletal defects noted, no edema  SKIN: Small scabbed areas of tinea corporis anterior chest left and right  NEURO: Normal strength and tone, mentation intact and speech normal  PSYCH: mentation appears normal, affect normal/bright      The longitudinal plan of care for the diagnosis(es)/condition(s) as documented were addressed during this visit. Due to the added complexity in care, I will continue to support " Aron in the subsequent management and with ongoing continuity of care.  Signed Electronically by: Dayday Mallory MD

## 2025-03-24 ENCOUNTER — OFFICE VISIT (OUTPATIENT)
Dept: FAMILY MEDICINE | Facility: CLINIC | Age: 30
End: 2025-03-24
Payer: COMMERCIAL

## 2025-03-24 VITALS
HEIGHT: 73 IN | BODY MASS INDEX: 24.25 KG/M2 | DIASTOLIC BLOOD PRESSURE: 66 MMHG | WEIGHT: 183 LBS | HEART RATE: 73 BPM | RESPIRATION RATE: 16 BRPM | TEMPERATURE: 98 F | SYSTOLIC BLOOD PRESSURE: 116 MMHG | OXYGEN SATURATION: 98 %

## 2025-03-24 DIAGNOSIS — J06.9 VIRAL URI: ICD-10-CM

## 2025-03-24 DIAGNOSIS — M25.511 CHRONIC RIGHT SHOULDER PAIN: ICD-10-CM

## 2025-03-24 DIAGNOSIS — S42.264K: ICD-10-CM

## 2025-03-24 DIAGNOSIS — G89.29 CHRONIC RIGHT SHOULDER PAIN: ICD-10-CM

## 2025-03-24 DIAGNOSIS — Z01.818 PREOP GENERAL PHYSICAL EXAM: Primary | ICD-10-CM

## 2025-03-24 PROCEDURE — 3074F SYST BP LT 130 MM HG: CPT | Performed by: FAMILY MEDICINE

## 2025-03-24 PROCEDURE — 3078F DIAST BP <80 MM HG: CPT | Performed by: FAMILY MEDICINE

## 2025-03-24 PROCEDURE — 99214 OFFICE O/P EST MOD 30 MIN: CPT | Performed by: FAMILY MEDICINE

## 2025-03-24 NOTE — PATIENT INSTRUCTIONS
How to Take Your Medication Before Surgery  Preoperative Medication Instructions   Antiplatelet or Anticoagulation Medication Instructions   - We reviewed the medication list and the patient is not on an antiplatelet or anticoagulation medications.    Additional Medication Instructions  We reviewed the medication list and there are no chronic medications that need to be adjusted for this procedure.       Patient Education   Preparing for Your Surgery  For Adults  Getting started  In most cases, a nurse will call to review your health history and instructions. They will give you an arrival time based on your scheduled surgery time. Please be ready to share:  Your doctor's clinic name and phone number  Your medical, surgical, and anesthesia history  A list of allergies and sensitivities  A list of medicines, including herbal treatments and over-the-counter drugs  Whether the patient has a legal guardian (ask how to send us the papers in advance)  Note: You may not receive a call if you were seen at our PAC (Preoperative Assessment Center).  Please tell us if you're pregnant--or if there's any chance you might be pregnant. Some surgeries may injure a fetus (unborn baby), so they require a pregnancy test. Surgeries that are safe for a fetus don't always need a test, and you can choose whether to have one.   Preparing for surgery  Within 10 to 30 days of surgery: Have a pre-op exam (sometimes called an H&P, or History and Physical). This can be done at a clinic or pre-operative center.  If you're having a , you may not need this exam. Talk to your care team.  At your pre-op exam, talk to your care team about all medicines you take. (This includes CBD oil and any drugs, such as THC, marijuana, and other forms of cannabis.) If you need to stop any medicine before surgery, ask when to start taking it again.  This is for your safety. Many medicines and drugs can make you bleed too much during surgery. Some change  how well surgery (anesthesia) drugs work.  Call your insurance company to let them know you're having surgery. (If you don't have insurance, call [phone].)  Call your clinic if there's any change in your health. This includes a scrape or scratch near the surgery site, or any signs of a cold (sore throat, runny nose, cough, rash, fever).  Eating and drinking guidelines  For your safety: Unless your surgeon tells you otherwise, follow the guidelines below.  Eat and drink as normal until 8 hours before you arrive for surgery. After that, no food or milk. You can spit out gum when you arrive.  Drink clear liquids until 2 hours before you arrive. These are liquids you can see through, like water, Gatorade, and Propel Water. They also include plain black coffee and tea (no cream or milk).  No alcohol for 24 hours before you arrive. The night before surgery, stop any drinks that contain THC.  If your care team tells you to take medicine on the morning of surgery, it's okay to take it with a sip of water. No other medicines or drugs are allowed (including CBD oil)--follow your care team's instructions.  If you have questions the day of surgery, call your hospital or surgery center.   Preventing infection  Shower or bathe the night before and the morning of surgery. Follow the instructions your clinic gave you. (If no instructions, use regular soap.)  Don't shave or clip hair near your surgery site. We'll remove the hair if needed.  Don't smoke or vape the morning of surgery. No chewing tobacco for 6 hours before you arrive. A nicotine patch is okay. You may spit out nicotine gum when you arrive.  For some surgeries, the surgeon will tell you to fully quit smoking and nicotine.  We will make every effort to keep you safe from infection. We will:  Clean our hands often with soap and water (or an alcohol-based hand rub).  Clean the skin at your surgery site with a special soap that kills germs.  Give you a special gown to  keep you warm. (Cold raises the risk of infection.)  Wear hair covers, masks, gowns, and gloves during surgery.  Give antibiotic medicine, if prescribed. Not all surgeries need this medicine.  What to bring on the day of surgery  Photo ID and insurance card  Copy of your health care directive, if you have one  Glasses and hearing aids (bring cases)  You can't wear contacts during surgery  Inhaler and eye drops, if you use them (tell us about these when you arrive)  CPAP machine or breathing device, if you use them  A few personal items, if spending the night  If you have . . .  A pacemaker, ICD (cardiac defibrillator), or other implant: Bring the ID card.  An implanted stimulator: Bring the remote control.  A legal guardian: Bring a copy of the certified (court-stamped) guardianship papers.  Please remove any jewelry, including body piercings. Leave jewelry and other valuables at home.  If you're going home the day of surgery  You must have a responsible adult drive you home. They should stay with you overnight as well.  If you don't have someone to stay with you, and you aren't safe to go home alone, we may keep you overnight. Insurance often won't pay for this.  After surgery  If it's hard to control your pain or you need more pain medicine, please call your surgeon's office.  Questions?   If you have any questions for your care team, list them here:   ____________________________________________________________________________________________________________________________________________________________________________________________________________________________________________________________  For informational purposes only. Not to replace the advice of your health care provider. Copyright   2003, 2019 [facility]. All rights reserved. Clinically reviewed by Sudeep Presley MD. [employer] [id number] - REV 08/24.

## 2025-03-24 NOTE — PROGRESS NOTES
Preoperative Evaluation  [facility]  [address] [facility] [address]  Phone: [phone]  Fax: [phone]  Primary Provider: Dayday Mallory MD  Pre-op Performing Provider: New Noel MD  Mar 24, 2025             3/24/2025   Surgical Information   What procedure is being done? Right shoulder open, reduction internal fixation lesser tuberosity non-union, subscapularis repair, biceps tenodesis   Facility or Hospital where procedure/surgery will be performed: [facility]    Who is doing the procedure / surgery? dr Avtar Rendon   Date of surgery / procedure: April 4, 2025   Time of surgery / procedure: tbd   Where do you plan to recover after surgery? at home with family     Fax number for surgical facility: [phone]     Assessment & Plan     The proposed surgical procedure is considered INTERMEDIATE risk.    Preop general physical exam  Nondisplaced fracture of lesser tuberosity of right humerus, subsequent encounter for fracture with nonunion  Chronic right shoulder pain    Aron is approved for the surgical procedure as planned  Reviewed recent laboratory testing showing a normal hemoglobin of 14.4 and normal electrolytes  GFR was greater than 90  Recommend holding NSAIDs and OTC supplements least 1 week prior to surgery  Follow-up as recommended by orthopedic surgery    Viral URI    He is improving and anticipate that symptoms will have resolved prior to surgery  Recommend that he follow-up prior to surgery if symptoms should worsen            - No identified additional risk factors other than previously addressed    Preoperative Medication Instructions  Antiplatelet or Anticoagulation Medication Instructions   - We reviewed the medication list and the patient is not on an antiplatelet or anticoagulation medications.    Additional Medication Instructions  We reviewed the medication list and there are no chronic medications that need to be  adjusted for this procedure.    Recommendation  Approval given to proceed with proposed procedure, without further diagnostic evaluation.    Subjective   Aron is a 29 year old, presenting for the following:  Pre-Op Exam          3/24/2025    12:43 PM   Additional Questions   Roomed by Nicci ISLAS:     Aron  is a pleasant 29-year-old male who presents to the clinic for preoperative evaluation.  He has a history of chronic right shoulder pain and a remote history of an injury to his right shoulder.  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  He also was noted to have a subluxation of the long head of the biceps tendon. Given ongoing symptoms and failure to respond to conservative therapy he is a candidate for the open surgery as planned by orthopedics.      His medical history is otherwise unremarkable for chronic conditions.  He does not take regular medications.      He has had a recent cold with some nasal congestion which started 4 days ago.  He states he has been feeling better.  He denies fever, shortness of breath, sinus pressure, or other concerns.                   3/24/2025   Pre-Op Questionnaire   Have you ever had a heart attack or stroke? No   Have you ever had surgery on your heart or blood vessels, such as a stent placement, a coronary artery bypass, or surgery on an artery in your head, neck, heart, or legs? No   Do you have chest pain with activity? No   Do you have a history of heart failure? No   Do you currently have a cold, bronchitis or symptoms of other infection? (!) YES Mild nasal congestion, resolving. Not short of breath   Do you have a cough, shortness of breath, or wheezing? No   Do you or anyone in your family have previous history of blood clots? No   Do you or does anyone in your family have a serious bleeding problem such as prolonged bleeding following surgeries or cuts? No   Have you ever had problems with  anemia or been told to take iron pills? No   Have you had any abnormal blood loss such as black, tarry or bloody stools? No   Have you ever had a blood transfusion? No   Are you willing to have a blood transfusion if it is medically needed before, during, or after your surgery? Yes   Have you or any of your relatives ever had problems with anesthesia? No   Do you have sleep apnea, excessive snoring or daytime drowsiness? No   Do you have any artifical heart valves or other implanted medical devices like a pacemaker, defibrillator, or continuous glucose monitor? No   Do you have artificial joints? No   Are you allergic to latex? No     Health Care Directive  Patient does not have a Health Care Directive: Discussed advance care planning with patient; however, patient declined at this time.    Preoperative Review of    reviewed - no record of controlled substances prescribed.      Status of Chronic Conditions:  See problem list for active medical problems.  Problems all longstanding and stable, except as noted/documented.  See ROS for pertinent symptoms related to these conditions.    Patient Active Problem List    Diagnosis Date Noted    Mass of left foot      Priority: Medium    Fibroma of left foot 10/17/2019     Priority: Medium     Added automatically from request for surgery [id number]        Pes Planus      Priority: Medium     Created by Conversion        Acne      Priority: Medium     Created by Conversion          No past medical history on file.  Past Surgical History:   Procedure Laterality Date    HC REMOVE TONSILS/ADENOIDS,<11 Y/O      Description: Tonsillectomy With Adenoidectomy;  Recorded: 08/08/2008;  Comments: 1998    TONSILLECTOMY      [facility] PART EXCIS PLANTAR FASCIA Left 11/1/2019    Procedure: EXCISION, FIBROMA, PLANTAR left foot;  Surgeon: Robe Almazan DPM;  Location: [facility];  Service: Podiatry     Current Outpatient Medications   Medication Sig Dispense Refill    clotrimazole  "(LOTRIMIN) 1 % external cream Apply topically 2 times daily. 30 g 0       No Known Allergies     Social History     Tobacco Use    Smoking status: Never    Smokeless tobacco: Never   Substance Use Topics    Alcohol use: Yes       History   Drug Use Unknown             Review of Systems  Constitutional, HEENT, cardiovascular, pulmonary, gi and gu systems are negative, except as otherwise noted.    Objective    /66 (BP Location: Left arm, Patient Position: Sitting, Cuff Size: Adult Regular)   Pulse 73   Temp 98  F (36.7  C) (Oral)   Resp 16   Ht 1.842 m (6' 0.5\")   Wt 83 kg (183 lb)   SpO2 98%   BMI 24.48 kg/m     Estimated body mass index is 24.48 kg/m  as calculated from the following:    Height as of this encounter: 1.842 m (6' 0.5\").    Weight as of this encounter: 83 kg (183 lb).  Physical Exam  GENERAL: alert and no distress  EYES: Eyes grossly normal to inspection, PERRL and conjunctivae and sclerae normal  HENT: normal cephalic/atraumatic and ear canals and TM's normal  RESP: lungs clear to auscultation - no rales, rhonchi or wheezes  CV: regular rate and rhythm, normal S1 S2, no S3 or S4, no murmur, click or rub, no peripheral edema  MS: no gross musculoskeletal defects noted, no edema  SKIN: no suspicious lesions or rashes  NEURO: Normal strength and tone, mentation intact and speech normal  PSYCH: mentation appears normal, affect normal/bright    Recent Labs   Lab Test 01/30/25  0805   HGB 14.4         POTASSIUM 3.8   CR 1.07        Diagnostics  No labs were ordered during this visit.   No EKG required, no history of coronary heart disease, significant arrhythmia, peripheral arterial disease or other structural heart disease.    Revised Cardiac Risk Index (RCRI)  The patient has the following serious cardiovascular risks for perioperative complications:   - No serious cardiac risks = 0 points     RCRI Interpretation: 0 points: Class I (very low risk - 0.4% complication rate)     "     Signed Electronically by: New Noel MD  A copy of this evaluation report is provided to the requesting physician.